# Patient Record
Sex: FEMALE | URBAN - METROPOLITAN AREA
[De-identification: names, ages, dates, MRNs, and addresses within clinical notes are randomized per-mention and may not be internally consistent; named-entity substitution may affect disease eponyms.]

---

## 2021-08-09 ENCOUNTER — APPOINTMENT (RX ONLY)
Dept: URBAN - METROPOLITAN AREA TELEMEDICINE 2 | Facility: TELEMEDICINE | Age: 61
Setting detail: DERMATOLOGY
End: 2021-08-09

## 2021-08-09 DIAGNOSIS — Z41.9 ENCOUNTER FOR PROCEDURE FOR PURPOSES OTHER THAN REMEDYING HEALTH STATE, UNSPECIFIED: ICD-10-CM

## 2021-08-09 DIAGNOSIS — Q80.8 OTHER CONGENITAL ICHTHYOSIS: ICD-10-CM

## 2021-08-09 DIAGNOSIS — Z71.89 OTHER SPECIFIED COUNSELING: ICD-10-CM

## 2021-08-09 DIAGNOSIS — L81.1 CHLOASMA: ICD-10-CM

## 2021-08-09 PROCEDURE — ? PRODUCT LINE (ACNE)

## 2021-08-09 PROCEDURE — ? COSMETIC CONSULTATION: BOTULINUM TOXIN

## 2021-08-09 PROCEDURE — ? PRESCRIPTION

## 2021-08-09 PROCEDURE — ? PRODUCT LINE (SKINCEUTICALS)

## 2021-08-09 PROCEDURE — ? PRODUCT LINE (ANTI-AGING)

## 2021-08-09 PROCEDURE — ? COSMETIC CONSULTATION: FILLERS

## 2021-08-09 PROCEDURE — ? OTHER

## 2021-08-09 PROCEDURE — ? MEDICAL CONSULTATION: CHEMICAL PEELS

## 2021-08-09 PROCEDURE — ? COUNSELING

## 2021-08-09 PROCEDURE — 99213 OFFICE O/P EST LOW 20 MIN: CPT

## 2021-08-09 PROCEDURE — ? PRESCRIPTION MEDICATION MANAGEMENT

## 2021-08-09 PROCEDURE — ? BOTOX

## 2021-08-09 RX ORDER — AMMONIUM LACTATE 12 G/100G
LOTION TOPICAL QD
Qty: 1 | Refills: 5 | Status: ERX | COMMUNITY
Start: 2021-08-09

## 2021-08-09 RX ADMIN — AMMONIUM LACTATE 1: 12 LOTION TOPICAL at 00:00

## 2021-08-09 ASSESSMENT — LOCATION ZONE DERM
LOCATION ZONE: FACE
LOCATION ZONE: ARM

## 2021-08-09 ASSESSMENT — LOCATION SIMPLE DESCRIPTION DERM
LOCATION SIMPLE: LEFT FOREHEAD
LOCATION SIMPLE: LEFT FOREARM
LOCATION SIMPLE: LEFT CHEEK
LOCATION SIMPLE: RIGHT FOREARM

## 2021-08-09 ASSESSMENT — LOCATION DETAILED DESCRIPTION DERM
LOCATION DETAILED: LEFT FOREHEAD
LOCATION DETAILED: LEFT SUPERIOR CENTRAL MALAR CHEEK
LOCATION DETAILED: LEFT PROXIMAL DORSAL FOREARM
LOCATION DETAILED: RIGHT PROXIMAL DORSAL FOREARM

## 2021-08-09 NOTE — HPI: DISCOLORATION (MELASMA)
How Severe Are They?: moderate
Is This A New Presentation, Or A Follow-Up?: Discoloration
Additional History: Has been using Tri-Daniela for 4 years

## 2021-08-09 NOTE — PROCEDURE: PRODUCT LINE (ANTI-AGING)
Name Of Product 1: Obagi Elastiderm eye cream
Product 55 Units: 0
Product 72 Price (In Dollars - Numeric Only, No Special Characters Or $): 0.00
Product 5 Price (In Dollars - Numeric Only, No Special Characters Or $): 81
Product 1 Price (In Dollars - Numeric Only, No Special Characters Or $): 112.00
Name Of Product 10: Pfloretin CF
Name Of Product 6: Skinceuticals epidermal repair
Product 10 Price (In Dollars - Numeric Only, No Special Characters Or $): 166.00
Product 10 Units: 1
Name Of Product 2: Skinceuticals CE Eitan
Detail Level: Zone
Product 6 Price (In Dollars - Numeric Only, No Special Characters Or $): 70.00
Product 2 Price (In Dollars - Numeric Only, No Special Characters Or $): 165.00
Name Of Product 7: Skinceuticals triple lipid
Assigning Risk Information: Per AMA, level of risk is based upon consequences of the problem(s) addressed at the encounter when appropriately treated. Risk also includes medical decision making related to the need to initiate or forego further testing, treatment and/or hospitalization. Over the counter medication are assigned a risk level of low. Prescription medication management is assigned a risk level of moderate.
Name Of Product 3: Skinceuticals AGE eye complex
Risk Of Complication Category: No MDM
Product 3 Price (In Dollars - Numeric Only, No Special Characters Or $): 91.00
Name Of Product 8: Revision hydrating serum
Name Of Product 4: Revision Revox 7
Allow Plan To Count Towards E/M Coding: Yes
Product 4 Price (In Dollars - Numeric Only, No Special Characters Or $): 130
Name Of Product 9: DEJ eye cream
Name Of Product 5: Teamine eye cream

## 2021-08-09 NOTE — PROCEDURE: PRODUCT LINE (SKINCEUTICALS)
Product 3 Price (In Dollars - Numeric Only, No Special Characters Or $): 165.00
Product 24 Units: 0
Product 24 Price (In Dollars - Numeric Only, No Special Characters Or $): 0.00
Risk Of Complication Category: No MDM
Allow Plan To Count Towards E/M Coding: Yes
Name Of Product 1: Skinceuticals phytocorrective gel
Product 1 Price (In Dollars - Numeric Only, No Special Characters Or $): 65
Name Of Product 2: Discoloration defense
Product 2 Price (In Dollars - Numeric Only, No Special Characters Or $): 80.00
Product 2 Units: 1
Detail Level: Zone
Name Of Product 3: JAZ Laird
Assigning Risk Information: Per AMA, level of risk is based upon consequences of the problem(s) addressed at the encounter when appropriately treated. Risk also includes medical decision making related to the need to initiate or forego further testing, treatment and/or hospitalization. Over the counter medication are assigned a risk level of low. Prescription medication management is assigned a risk level of moderate.

## 2021-08-09 NOTE — PROCEDURE: PRODUCT LINE (ACNE)
Product 18 Price (In Dollars - Numeric Only, No Special Characters Or $): 0.00
Product 76 Units: 0
Assigning Risk Information: Per AMA, level of risk is based upon consequences of the problem(s) addressed at the encounter when appropriately treated. Risk also includes medical decision making related to the need to initiate or forego further testing, treatment and/or hospitalization. Over the counter medication are assigned a risk level of low. Prescription medication management is assigned a risk level of moderate.
Send Charges To Patient Encounter: Yes
Name Of Product 5: Skinbetter Alpha Ret
Product 5 Price (In Dollars - Numeric Only, No Special Characters Or $): 120.00
Product 5 Units: 1
Name Of Product 1: Skinceuticals LHA Cleansing Gel
Product 1 Price (In Dollars - Numeric Only, No Special Characters Or $): 38.00
Name Of Product 2: Dr. Devan reyna
Product 2 Price (In Dollars - Numeric Only, No Special Characters Or $): 7.58
Name Of Product 3: ELTA MD foaming cleanser
Product 3 Price (In Dollars - Numeric Only, No Special Characters Or $): 24.50
Risk Of Complication Category: No MDM
Detail Level: Zone
Name Of Product 4: ELTA md spf 46 clear

## 2021-08-09 NOTE — PROCEDURE: BOTOX
Dilution (U/0.1 Cc): 5
Forehead Units: 6
Show Right And Left Periorbital Units: No
Levator Labii Superioris Units: 0
Show Additional Area 3: Yes
Post-Care Instructions: Patient instructed to not lie down for 4 hours and limit physical activity for 24 hours. Patient instructed not to travel by airplane for 48 hours.
Additional Area 1 Location: Brows
Glabellar Complex Units: 20
Reconstitution Date (Optional): 8/9/21
Expiration Date (Month Year): 11/23
Additional Area 2 Location: Lower lids
Price (Use Numbers Only, No Special Characters Or $): 479
Periorbital Skin Units: 12
Detail Level: Detailed
Lot #: [id number]
Consent: Written consent obtained. Risks include but not limited to lid/brow ptosis, bruising, swelling, diplopia, temporary effect, incomplete chemical denervation.

## 2021-08-09 NOTE — PROCEDURE: PRESCRIPTION MEDICATION MANAGEMENT
Discontinue Regimen: - Tri-Daniela
Detail Level: Zone
Render In Strict Bullet Format?: No
Plan: Rec consult with [facility]nRec SkinBetter Alpharet, Skinceuticals LHA Cleanser, Phloretin, Discoloration Defense
Initiate Treatment: -Ammonium lactate lotion daily

## 2021-08-09 NOTE — PROCEDURE: OTHER
Render Risk Assessment In Note?: no
Note Text (......Xxx Chief Complaint.): This diagnosis correlates with the
Other (Free Text): Discussed 3 syringes of Restylane Lyft- cheeks, jaw p, marionettjono - discussed may need additional 1-2 syringes for full correction \\nQuoted patient $1875 for 3 syringes ($150 off 3 syringes included)
Detail Level: Detailed
Other (Free Text): Discussed going very light on forehead

## 2021-08-27 ENCOUNTER — APPOINTMENT (RX ONLY)
Dept: URBAN - METROPOLITAN AREA TELEMEDICINE 2 | Facility: TELEMEDICINE | Age: 61
Setting detail: DERMATOLOGY
End: 2021-08-27

## 2021-08-27 DIAGNOSIS — Z41.9 ENCOUNTER FOR PROCEDURE FOR PURPOSES OTHER THAN REMEDYING HEALTH STATE, UNSPECIFIED: ICD-10-CM

## 2021-08-27 PROCEDURE — ? COSMETIC FOLLOW-UP

## 2021-08-27 PROCEDURE — ? OTHER (COSMETIC)

## 2021-08-27 PROCEDURE — ? COSMETIC CONSULTATION: BOTULINUM TOXIN

## 2021-08-27 NOTE — PROCEDURE: COSMETIC FOLLOW-UP
Global Improvement: Very Good
Patient Satisfaction: Pleased
Treatment (Optional): Botox
Detail Level: Zone
Side Effects Or Complications: None

## 2021-08-27 NOTE — PROCEDURE: MIPS QUALITY
Quality 130: Documentation Of Current Medications In The Medical Record: Current Medications Documented
Detail Level: Detailed
Quality 431: Preventive Care And Screening: Unhealthy Alcohol Use - Screening: Patient identified as an unhealthy alcohol user when screened for unhealthy alcohol use using a systematic screening method and received brief counseling
Quality 226: Preventive Care And Screening: Tobacco Use: Screening And Cessation Intervention: Patient screened for tobacco use and is an ex/non-smoker